# Patient Record
Sex: FEMALE | Race: WHITE | Employment: FULL TIME | URBAN - METROPOLITAN AREA
[De-identification: names, ages, dates, MRNs, and addresses within clinical notes are randomized per-mention and may not be internally consistent; named-entity substitution may affect disease eponyms.]

---

## 2017-02-06 ENCOUNTER — HOSPITAL ENCOUNTER (EMERGENCY)
Age: 27
Discharge: HOME | End: 2017-02-06
Attending: EMERGENCY MEDICINE
Payer: MEDICAID

## 2017-02-06 VITALS
RESPIRATION RATE: 14 BRPM | DIASTOLIC BLOOD PRESSURE: 75 MMHG | HEIGHT: 62 IN | TEMPERATURE: 98.2 F | OXYGEN SATURATION: 100 % | SYSTOLIC BLOOD PRESSURE: 112 MMHG | HEART RATE: 88 BPM | WEIGHT: 153 LBS | BODY MASS INDEX: 28.16 KG/M2

## 2017-02-06 DIAGNOSIS — K52.9 GASTROENTERITIS: Primary | ICD-10-CM

## 2017-02-06 LAB
APPEARANCE UR: CLEAR
BACTERIA URNS QL MICRO: ABNORMAL /HPF
BILIRUB UR QL: NEGATIVE
COLOR UR: YELLOW
EPITH CASTS URNS QL MICRO: ABNORMAL /LPF (ref 0–5)
GLUCOSE UR STRIP.AUTO-MCNC: NEGATIVE MG/DL
HCG UR QL: NEGATIVE
HGB UR QL STRIP: ABNORMAL
KETONES UR QL STRIP.AUTO: NEGATIVE MG/DL
LEUKOCYTE ESTERASE UR QL STRIP.AUTO: ABNORMAL
NITRITE UR QL STRIP.AUTO: NEGATIVE
PH UR STRIP: 7.5 [PH] (ref 5–8)
PROT UR STRIP-MCNC: NEGATIVE MG/DL
RBC #/AREA URNS HPF: ABNORMAL /HPF (ref 0–5)
SP GR UR REFRACTOMETRY: 1.01 (ref 1–1.03)
UROBILINOGEN UR QL STRIP.AUTO: 0.2 EU/DL (ref 0.2–1)
WBC URNS QL MICRO: ABNORMAL /HPF (ref 0–4)

## 2017-02-06 PROCEDURE — 99284 EMERGENCY DEPT VISIT MOD MDM: CPT

## 2017-02-06 PROCEDURE — 81025 URINE PREGNANCY TEST: CPT | Performed by: EMERGENCY MEDICINE

## 2017-02-06 PROCEDURE — 81001 URINALYSIS AUTO W/SCOPE: CPT | Performed by: EMERGENCY MEDICINE

## 2017-02-06 RX ORDER — ACETAMINOPHEN AND CODEINE PHOSPHATE 300; 30 MG/1; MG/1
1 TABLET ORAL
COMMUNITY

## 2017-02-06 RX ORDER — ONDANSETRON 8 MG/1
8 TABLET, ORALLY DISINTEGRATING ORAL
Qty: 30 TAB | Refills: 0 | Status: SHIPPED | OUTPATIENT
Start: 2017-02-06 | End: 2018-02-26

## 2017-02-06 RX ORDER — SUMATRIPTAN 25 MG/1
25 TABLET, FILM COATED ORAL
COMMUNITY

## 2017-02-06 NOTE — LETTER
NOTIFICATION RETURN TO WORK / SCHOOL 
 
2/6/2017 4:17 PM 
 
Ms. Sandy Kruse [address] [facility] [id number] To Whom It May Concern: 
 
Sandy Kruse is currently under the care of [id number] [facility] EMERGENCY DEPT. She will return to work/school on: 2/8/17 If there are questions or concerns please have the patient contact our office.  
 
 
 
Sincerely, 
 
 
Gurdeep Condon MD

## 2017-02-06 NOTE — DISCHARGE INSTRUCTIONS
Gastroenteritis: Care Instructions  Your Care Instructions  Gastroenteritis is an illness that may cause nausea, vomiting, and diarrhea. It is sometimes called \"stomach flu. \" It can be caused by bacteria or a virus. You will probably begin to feel better in 1 to 2 days. In the meantime, get plenty of rest and make sure you do not become dehydrated. Dehydration occurs when your body loses too much fluid. Follow-up care is a key part of your treatment and safety. Be sure to make and go to all appointments, and call your doctor if you are having problems. Its also a good idea to know your test results and keep a list of the medicines you take. How can you care for yourself at home? · If your doctor prescribed antibiotics, take them as directed. Do not stop taking them just because you feel better. You need to take the full course of antibiotics. · Drink plenty of fluids to prevent dehydration, enough so that your urine is light yellow or clear like water. Choose water and other caffeine-free clear liquids until you feel better. If you have kidney, heart, or liver disease and have to limit fluids, talk with your doctor before you increase your fluid intake. · Drink fluids slowly, in frequent, small amounts, because drinking too much too fast can cause vomiting. · Begin eating mild foods, such as dry toast, yogurt, applesauce, bananas, and rice. Avoid spicy, hot, or high-fat foods, and do not drink alcohol or caffeine for a day or two. Do not drink milk or eat ice cream until you are feeling better. How to prevent gastroenteritis  · Keep hot foods hot and cold foods cold. · Do not eat meats, dressings, salads, or other foods that have been kept at room temperature for more than 2 hours. · Use a thermometer to check your refrigerator. It should be between 34°F and 40°F.  · Defrost meats in the refrigerator or microwave, not on the kitchen counter. · Keep your hands and your kitchen clean.  Wash your hands, cutting boards, and countertops with hot soapy water frequently. · Cook meat until it is well done. · Do not eat raw eggs or uncooked sauces made with raw eggs. · Do not take chances. If food looks or tastes spoiled, throw it out. When should you call for help? Call 911 anytime you think you may need emergency care. For example, call if:  · You vomit blood or what looks like coffee grounds. · You passed out (lost consciousness). · You pass maroon or very bloody stools. Call your doctor now or seek immediate medical care if:  · You have severe belly pain. · You have signs of needing more fluids. You have sunken eyes, a dry mouth, and pass only a little dark urine. · You feel like you are going to faint. · You have increased belly pain that does not go away in 1 to 2 days. · You have new or increased nausea, or you are vomiting. · You have a new or higher fever. · Your stools are black and tarlike or have streaks of blood. Watch closely for changes in your health, and be sure to contact your doctor if:  · You are dizzy or lightheaded. · You urinate less than usual, or your urine is dark yellow or brown. · You do not feel better with each day that goes by. Where can you learn more? Go to http://bina-renée.info/. Enter N142 in the search box to learn more about \"Gastroenteritis: Care Instructions. \"  Current as of: May 24, 2016  Content Version: 11.1  © 9448-9845 [employer]. Care instructions adapted under license by "[employer] (which disclaims liability or warranty for this information). If you have questions about a medical condition or this instruction, always ask your healthcare professional. [address] any warranty or liability for your use of this information.

## 2017-02-06 NOTE — ED PROVIDER NOTES
HPI Comments: 4:12 PM Elena Dasilva is a 27 y.o. female who presents to the ED c/o nausea onset 5 days ago. Pt also c/o vomiting, diarrhea, decreased appetite, and generalized myalgias. Pt tried Benadryl, Pepto Bismol, and Imodium to minimal relief of sx. Pt admits to sick contact- \"son who goes to day care and caught the flu. \" Pt denies fever, dysuria, or any other sx at this time. Patient is a 27 y.o. female presenting with abdominal pain, vomiting, and diarrhea. The history is provided by the patient. No  was used. Abdominal Pain    Associated symptoms include diarrhea, nausea, vomiting and myalgias (generalized). Pertinent negatives include no fever, no dysuria, no hematuria, no headaches, no arthralgias and no chest pain. Vomiting    Associated symptoms include diarrhea and myalgias (generalized). Pertinent negatives include no chills, no fever, no headaches, no arthralgias, no cough and no headaches. Diarrhea    Associated symptoms include diarrhea, nausea, vomiting and myalgias (generalized). Pertinent negatives include no fever, no dysuria, no hematuria, no headaches, no arthralgias and no chest pain. Past Medical History:   Diagnosis Date    Chronic back pain     Neck pain     Neck sprain     Radiculopathy     Scoliosis     Strep throat     Upper back pain        Past Surgical History:   Procedure Laterality Date    Hx wisdom teeth extraction           Family History:   Problem Relation Age of Onset    No Known Problems Mother     No Known Problems Father        Social History     Social History    Marital status:      Spouse name: N/A    Number of children: N/A    Years of education: N/A     Occupational History    Not on file.      Social History Main Topics    Smoking status: Current Some Day Smoker     Packs/day: 0.50     Years: 10.00     Last attempt to quit: 2/1/2016    Smokeless tobacco: Never Used    Alcohol use No    Drug use: No    Sexual activity: Not on file     Other Topics Concern    Not on file     Social History Narrative         ALLERGIES: Vicodin [hydrocodone-acetaminophen]    Review of Systems   Constitutional: Positive for appetite change (decreased). Negative for chills, fatigue and fever. HENT: Negative for congestion, rhinorrhea and sore throat. Eyes: Negative for visual disturbance. Respiratory: Negative for cough and shortness of breath. Cardiovascular: Negative for chest pain and palpitations. Gastrointestinal: Positive for diarrhea, nausea and vomiting. Genitourinary: Negative for dysuria, hematuria and urgency. Musculoskeletal: Positive for myalgias (generalized). Negative for arthralgias. Skin: Negative for rash and wound. Neurological: Negative for dizziness and headaches. Psychiatric/Behavioral: The patient is not nervous/anxious. All other systems reviewed and are negative. Vitals:    02/06/17 1331   BP: 110/78   Pulse: (!) 106   Resp: 14   Temp: 98.2 °F (36.8 °C)   SpO2: 98%   Weight: 69.4 kg (153 lb)   Height: 5' 2\" (1.575 m)            Physical Exam   Constitutional: She is oriented to person, place, and time. She appears well-developed. HENT:   Head: Normocephalic and atraumatic. Mouth/Throat: Oropharynx is clear and moist.   Eyes: Conjunctivae and EOM are normal. Pupils are equal, round, and reactive to light. Neck: Normal range of motion. Neck supple. Cardiovascular: Normal rate and regular rhythm. Pulmonary/Chest: Effort normal and breath sounds normal.   Abdominal: Soft. Musculoskeletal: Normal range of motion. Neurological: She is alert and oriented to person, place, and time. Skin: Skin is warm and dry. Psychiatric: She has a normal mood and affect. Nursing note and vitals reviewed.        [facility]  ED Course       Procedures      Medications ordered:   Medications - No data to display      Lab findings:  Labs Reviewed   URINALYSIS W/ RFLX MICROSCOPIC - Abnormal; Notable for the following:        Result Value    Blood MODERATE (*)     Leukocyte Esterase TRACE (*)     All other components within normal limits   URINE MICROSCOPIC ONLY - Abnormal; Notable for the following:     Bacteria 1+ (*)     All other components within normal limits   HCG URINE, QL       EKG interpretation:    X-Ray, CT or other radiology findings or impressions:  No orders to display       Progress notes, Consult notes or additional Procedure notes:     Reevaluation of patient:   I have reevaluated patient. Patient is feeling better. Dispo:  Patient was discharged in stable condition. Patient is to return to emergency department with any new or worsening condition. Scribe Attestation:   Orville Logan acting as a scribe for and in the presence of Jennifer Dwyer MD February 06, 2017 at 4:12 PM     Signed by: [place], February 06, 2017, 4:12 PM    Provider Attestation:   I personally performed the services described in the documentation, reviewed the documentation, as recorded by the scribe in my presence, and it accurately and completely records my words and actions.      Reviewed and signed by:  Jennifer Dwyer MD

## 2018-02-26 ENCOUNTER — APPOINTMENT (OUTPATIENT)
Dept: GENERAL RADIOLOGY | Age: 28
End: 2018-02-26
Attending: EMERGENCY MEDICINE
Payer: SELF-PAY

## 2018-02-26 ENCOUNTER — HOSPITAL ENCOUNTER (EMERGENCY)
Age: 28
Discharge: HOME | End: 2018-02-26
Attending: EMERGENCY MEDICINE
Payer: SELF-PAY

## 2018-02-26 VITALS
RESPIRATION RATE: 20 BRPM | DIASTOLIC BLOOD PRESSURE: 79 MMHG | HEIGHT: 62 IN | SYSTOLIC BLOOD PRESSURE: 120 MMHG | BODY MASS INDEX: 27.6 KG/M2 | OXYGEN SATURATION: 100 % | TEMPERATURE: 98.7 F | WEIGHT: 150 LBS | HEART RATE: 105 BPM

## 2018-02-26 DIAGNOSIS — R05.9 COUGH: ICD-10-CM

## 2018-02-26 DIAGNOSIS — J02.9 ACUTE PHARYNGITIS, UNSPECIFIED ETIOLOGY: Primary | ICD-10-CM

## 2018-02-26 PROCEDURE — 99282 EMERGENCY DEPT VISIT SF MDM: CPT

## 2018-02-26 PROCEDURE — 71046 X-RAY EXAM CHEST 2 VIEWS: CPT

## 2018-02-26 RX ORDER — AMOXICILLIN 500 MG/1
500 TABLET, FILM COATED ORAL
Qty: 14 TAB | Refills: 0 | Status: SHIPPED | OUTPATIENT
Start: 2018-02-26 | End: 2018-03-05

## 2018-02-26 RX ORDER — IBUPROFEN 600 MG/1
600 TABLET ORAL
Qty: 10 TAB | Refills: 0 | Status: SHIPPED | OUTPATIENT
Start: 2018-02-26 | End: 2018-03-03

## 2018-02-26 RX ORDER — GUAIFENESIN, PSEUDOEPHEDRINE HYDROCHLORIDE 600; 60 MG/1; MG/1
1 TABLET, EXTENDED RELEASE ORAL
Qty: 14 TAB | Refills: 0 | Status: SHIPPED | OUTPATIENT
Start: 2018-02-26 | End: 2018-03-05

## 2018-02-26 NOTE — ED PROVIDER NOTES
EMERGENCY DEPARTMENT HISTORY AND PHYSICAL EXAM    7:54 AM      Date: 2/26/2018  Patient Name: Niurka Schulte    History of Presenting Illness     Chief Complaint   Patient presents with    Flu         History Provided By: Patient    Chief Complaint: URI  Duration:  Days  Timing:  Acute  Location: respriatory  Quality: n/a  Severity: Mild  Modifying Factors: n/a  Associated Symptoms: sore throat, fever that resolved, and dry cough. All other sx denied. No other complaints at this time. Additional History (Context): Niurka Schulte is a 32 y.o. female with a h/o Strep throat, and Smoking 0.5PPD who presents to the ED with c/o URI onset 5 days ago. Pt reported a sore throat, rhinorrhea, fever that resolved, and dry cough. Pt denied n/v/d, CP, SOB, urinary sx's, numbness, or weakness. All other sx denied. No other complaints at this time. PCP: Marie Haq MD      Past History     Past Medical History:  Past Medical History:   Diagnosis Date    Chronic back pain     Ill-defined condition     sleep apnea    Neck pain     Neck sprain     Radiculopathy     Scoliosis     Strep throat     Upper back pain        Past Surgical History:  Past Surgical History:   Procedure Laterality Date    HX WISDOM TEETH EXTRACTION         Family History:  Family History   Problem Relation Age of Onset    No Known Problems Mother     No Known Problems Father        Social History:  Social History   Substance Use Topics    Smoking status: Former Smoker     Packs/day: 0.50     Years: 10.00     Quit date: 2/1/2016    Smokeless tobacco: Never Used    Alcohol use No       Allergies: Allergies   Allergen Reactions    Vicodin [Hydrocodone-Acetaminophen] Other (comments)     HA         Review of Systems       Review of Systems   Constitutional: Positive for fever. Negative for appetite change. HENT: Positive for sore throat and trouble swallowing.  Negative for drooling, facial swelling, sinus pain and sinus pressure. Eyes: Negative for redness. Respiratory: Positive for cough. Negative for shortness of breath and wheezing. Gastrointestinal: Negative for abdominal pain, nausea and vomiting. Genitourinary: Negative for dysuria. Musculoskeletal: Negative for neck pain and neck stiffness. Skin: Negative for pallor. Neurological: Negative for headaches. Hematological: Does not bruise/bleed easily. All other systems reviewed and are negative. Physical Exam     Visit Vitals    /79 (BP 1 Location: Left arm)    Pulse (!) 105    Temp 98.7 °F (37.1 °C)    Resp 20    Ht 5' 2\" (1.575 m)    Wt 68 kg (150 lb)    SpO2 100%    BMI 27.44 kg/m2         Physical Exam   Constitutional: She is oriented to person, place, and time. She appears well-developed and well-nourished. No distress. Speaking full sentences. Pt is afebrile. HENT:   Head: Normocephalic and atraumatic. Mouth/Throat: Uvula is midline. No oropharyngeal exudate. Erythema in oropharynx   Eyes: Conjunctivae are normal. Pupils are equal, round, and reactive to light. Right eye exhibits no discharge. Left eye exhibits no discharge. No scleral icterus. Neck: Normal range of motion. Neck supple. No tracheal deviation present. No meningismus on her neck. Cardiovascular: Intact distal pulses. Tachycardia present. Capillary refill < 3 seconds. Slight tachycardia about 107. Pulmonary/Chest: Effort normal and breath sounds normal. No stridor. No respiratory distress. She has no wheezes. Active cough at bedside   Abdominal: Soft. Bowel sounds are normal. She exhibits no distension. There is no tenderness. Musculoskeletal: Normal range of motion. She exhibits no edema. Lymphadenopathy:     She has no cervical adenopathy. Neurological: She is alert and oriented to person, place, and time. No cranial nerve deficit. Skin: Skin is warm and dry. She is not diaphoretic.    Psychiatric: Her behavior is normal.   Nursing note and vitals reviewed. Diagnostic Study Results     Labs -  No results found for this or any previous visit (from the past 12 hour(s)). Radiologic Studies -   XR CHEST PA LAT    (Results Pending)   8:28 AM  Preliminary read showed no acute process per Dr. Jorge L George. Medical Decision Making   I am the first provider for this patient. I reviewed the vital signs, available nursing notes, past medical history, past surgical history, family history and social history. Vital Signs-Reviewed the patient's vital signs. Pulse Oximetry Analysis -  100 on room air (Interpretation)NL. Records Reviewed: Nursing Notes (Time of Review: 7:54 AM)    Provider Notes (Medical Decision Making):  MDM  Number of Diagnoses or Management Options  Acute pharyngitis, unspecified etiology:   Cough:   Diagnosis management comments: 8:14 AM  DDX pneumonia, URI, strep throat, flu. Will get CXR. If NL likely acute pharyngitis. Pt does states feels similar to her previous strep throat. If is flu is outside window to treat with Tamiflu will tx sx's with Tylenol nasal spray and cough medicine. Pt not at risk pt for flu complications. Amount and/or Complexity of Data Reviewed  Tests in the radiology section of CPT®: ordered and reviewed    Patient Progress  Patient progress: stable      Medications - No data to display    Procedures:     Core Measures:     Critical Care Time:     ED Course: Progress Notes, Reevaluation, and Consults:  I have reassessed the patient. I have discussed the workup, results and plan with the patient and patient is in agreement. Patient has no new complaint. Patient will be prescribed Amoxicillin, saline nasal spray, motrin, mucinex-d. Patient was discharge in stable condition. Patient was given outpatient follow up. Patient is to return to emergency department if any new or worsening condition. Diagnosis     Clinical Impression:   1. Acute pharyngitis, unspecified etiology    2. Cough        Disposition: d/c    Follow-up Information     Follow up With Details Comments [address], [place] Go in 2 days For follow up [address]  [address] [address] EMERGENCY DEPT Go to As needed, If symptoms worsen [address] [facility]  [phone]           Discharge Medication List as of 2/26/2018  8:34 AM      START taking these medications    Details   amoxicillin 500 mg tab Take 500 mg by mouth two (2) times daily as needed for up to 7 days. , Print, Disp-14 Tab, R-0      sodium chloride (SALINE NASAL) 0.65 % nasal squeeze bottle 0.1 mL by Both Nostrils route as needed for Congestion. Indications: Nasal Congestion, Print, Disp-15 mL, R-0      PSEUDOEPHEDRINE-guaiFENesin (MUCINEX D)  mg per tablet Take 1 Tab by mouth every twelve (12) hours as needed for up to 7 days. Indications: Cough, Nasal Congestion, Print, Disp-14 Tab, R-0      ibuprofen (MOTRIN) 600 mg tablet Take 1 Tab by mouth every eight (8) hours as needed for Pain for up to 5 days.  Indications: Fever, Pain, Print, Disp-10 Tab, R-0         CONTINUE these medications which have NOT CHANGED    Details   SUMAtriptan (IMITREX) 25 mg tablet Take 25 mg by mouth once as needed for Migraine., Historical Med      acetaminophen-codeine (TYLENOL-CODEINE #3) 300-30 mg per tablet Take 1 Tab by mouth every four (4) hours as needed for Pain., Historical Med      levonorgestrel (MIRENA) 20 mcg/24 hr (5 years) IUD 1 each by IntraUTERine route once., Historical Med           _______________________________    Attestations:  Scribe Attestation     Yareli Graves acting as a scribe for and in the presence of Leobardo Alexander, DO      February 26, 2018 at 8:28 AM       Provider Attestation:      I personally performed the services described in the documentation, reviewed the documentation, as recorded by the scribe in my presence, and it accurately and completely records my words and actions.  February 26, 2018 at 8:28 FARRUKH Melchor DO    _______________________________

## 2018-02-26 NOTE — DISCHARGE INSTRUCTIONS
Sore Throat: Care Instructions  Your Care Instructions    Infection by bacteria or a virus causes most sore throats. Cigarette smoke, dry air, air pollution, allergies, and yelling can also cause a sore throat. Sore throats can be painful and annoying. Fortunately, most sore throats go away on their own. If you have a bacterial infection, your doctor may prescribe antibiotics. Follow-up care is a key part of your treatment and safety. Be sure to make and go to all appointments, and call your doctor if you are having problems. It's also a good idea to know your test results and keep a list of the medicines you take. How can you care for yourself at home? · If your doctor prescribed antibiotics, take them as directed. Do not stop taking them just because you feel better. You need to take the full course of antibiotics. · Gargle with warm salt water once an hour to help reduce swelling and relieve discomfort. Use 1 teaspoon of salt mixed in 1 cup of warm water. · Take an over-the-counter pain medicine, such as acetaminophen (Tylenol), ibuprofen (Advil, Motrin), or naproxen (Aleve). Read and follow all instructions on the label. · Be careful when taking over-the-counter cold or flu medicines and Tylenol at the same time. Many of these medicines have acetaminophen, which is Tylenol. Read the labels to make sure that you are not taking more than the recommended dose. Too much acetaminophen (Tylenol) can be harmful. · Drink plenty of fluids. Fluids may help soothe an irritated throat. Hot fluids, such as tea or soup, may help decrease throat pain. · Use over-the-counter throat lozenges to soothe pain. Regular cough drops or hard candy may also help. These should not be given to young children because of the risk of choking. · Do not smoke or allow others to smoke around you. If you need help quitting, talk to your doctor about stop-smoking programs and medicines.  These can increase your chances of quitting for good. · Use a vaporizer or humidifier to add moisture to your bedroom. Follow the directions for cleaning the machine. When should you call for help? Call your doctor now or seek immediate medical care if:  ? · You have new or worse trouble swallowing. ? · Your sore throat gets much worse on one side. ? Watch closely for changes in your health, and be sure to contact your doctor if you do not get better as expected. Where can you learn more? Go to http://bina-renée.info/. Enter 062 441 80 19 in the search box to learn more about \"Sore Throat: Care Instructions. \"  Current as of: May 12, 2017  Content Version: 11.4  © 2571-7806 [employer]. Care instructions adapted under license by [employer] (which disclaims liability or warranty for this information). If you have questions about a medical condition or this instruction, always ask your healthcare professional. [place] [address] any warranty or liability for your use of this information. Cough: Care Instructions  Your Care Instructions    A cough is your body's response to something that bothers your throat or airways. Many things can cause a cough. You might cough because of a cold or the flu, bronchitis, or asthma. Smoking, postnasal drip, allergies, and stomach acid that backs up into your throat also can cause coughs. A cough is a symptom, not a disease. Most coughs stop when the cause, such as a cold, goes away. You can take a few steps at home to cough less and feel better. Follow-up care is a key part of your treatment and safety. Be sure to make and go to all appointments, and call your doctor if you are having problems. It's also a good idea to know your test results and keep a list of the medicines you take. How can you care for yourself at home? · Drink lots of water and other fluids. This helps thin the mucus and soothes a dry or sore throat.  Honey or lemon juice in hot water or tea may ease a dry cough. · Take cough medicine as directed by your doctor. · Prop up your head on pillows to help you breathe and ease a dry cough. · Try cough drops to soothe a dry or sore throat. Cough drops don't stop a cough. Medicine-flavored cough drops are no better than candy-flavored drops or hard candy. · Do not smoke. Avoid secondhand smoke. If you need help quitting, talk to your doctor about stop-smoking programs and medicines. These can increase your chances of quitting for good. When should you call for help? Call 911 anytime you think you may need emergency care. For example, call if:  ? · You have severe trouble breathing. ?Call your doctor now or seek immediate medical care if:  ? · You cough up blood. ? · You have new or worse trouble breathing. ? · You have a new or higher fever. ? · You have a new rash. ? Watch closely for changes in your health, and be sure to contact your doctor if:  ? · You cough more deeply or more often, especially if you notice more mucus or a change in the color of your mucus. ? · You have new symptoms, such as a sore throat, an earache, or sinus pain. ? · You do not get better as expected. Where can you learn more? Go to http://bina-renée.info/. Enter D279 in the search box to learn more about \"Cough: Care Instructions. \"  Current as of: May 12, 2017  Content Version: 11.4  © 9478-9756 [employer]. Care instructions adapted under license by [employer] (which disclaims liability or warranty for this information). If you have questions about a medical condition or this instruction, always ask your healthcare professional. [place] [address] any warranty or liability for your use of this information.

## 2018-05-24 ENCOUNTER — HOSPITAL ENCOUNTER (EMERGENCY)
Age: 28
Discharge: HOME | End: 2018-05-24
Attending: EMERGENCY MEDICINE
Payer: MEDICAID

## 2018-05-24 VITALS
TEMPERATURE: 98.3 F | RESPIRATION RATE: 18 BRPM | BODY MASS INDEX: 28.89 KG/M2 | HEIGHT: 62 IN | DIASTOLIC BLOOD PRESSURE: 60 MMHG | OXYGEN SATURATION: 96 % | WEIGHT: 157 LBS | SYSTOLIC BLOOD PRESSURE: 110 MMHG | HEART RATE: 88 BPM

## 2018-05-24 DIAGNOSIS — L74.0 HEAT RASH: Primary | ICD-10-CM

## 2018-05-24 DIAGNOSIS — R21 RASH AND NONSPECIFIC SKIN ERUPTION: ICD-10-CM

## 2018-05-24 PROCEDURE — 99283 EMERGENCY DEPT VISIT LOW MDM: CPT

## 2018-05-24 RX ORDER — PREDNISONE 20 MG/1
20 TABLET ORAL DAILY
Qty: 10 TAB | Refills: 0 | Status: SHIPPED | OUTPATIENT
Start: 2018-05-24 | End: 2018-06-03

## 2018-05-24 RX ORDER — LEVOTHYROXINE AND LIOTHYRONINE 38; 9 UG/1; UG/1
60 TABLET ORAL DAILY
COMMUNITY

## 2018-05-24 NOTE — ED PROVIDER NOTES
HPI Comments: Leopoldo Mazzoni is a 25 y.o. female with hx of hypothyroid and migraine that presets to the ED with a complaint of rash and peeling skin x1 week. PT state she was recently diagnosed with hypothyroid and prescribed NP Thyroid, they have been changing the dosage with last dosage change last week. Since that time pt states her fingers began peeling and she developed a rash below breasts and on inner thighs. She called PCP who discontinued her medications and they told her to come to ED for evaluation. She denies pain unless skin catches on something. NO other complaints at this time. SHe denies new lotions, soaps, make ups, cleaning detergents. Patient is a 25 y.o. female presenting with medication reaction. The history is provided by the patient. Medication Reaction    This is a new problem. The current episode started more than 2 days ago. The problem has not changed since onset. She has not vomited. There were no pill fragments found in her mouth. Pertinent negatives include no unusual behavior, no slurred speech, no nausea, no vomiting, no depression and no shortness of breath. Past Medical History:   Diagnosis Date    Chronic back pain     Ill-defined condition     sleep apnea    Neck pain     Neck sprain     Radiculopathy     Scoliosis     Strep throat     Upper back pain        Past Surgical History:   Procedure Laterality Date    HX WISDOM TEETH EXTRACTION           Family History:   Problem Relation Age of Onset    No Known Problems Mother     No Known Problems Father        Social History     Social History    Marital status:      Spouse name: N/A    Number of children: N/A    Years of education: N/A     Occupational History    Not on file.      Social History Main Topics    Smoking status: Former Smoker     Packs/day: 0.50     Years: 10.00     Quit date: 2/1/2016    Smokeless tobacco: Never Used    Alcohol use No    Drug use: No    Sexual activity: Not on file     Other Topics Concern    Not on file     Social History Narrative         ALLERGIES: Vicodin [hydrocodone-acetaminophen]    Review of Systems   Constitutional: Negative for fatigue. HENT: Negative for congestion. Eyes: Negative for redness. Respiratory: Negative for cough and shortness of breath. Cardiovascular: Negative for chest pain. Gastrointestinal: Negative for abdominal pain, diarrhea, nausea and vomiting. Genitourinary: Negative for difficulty urinating and dysuria. Musculoskeletal: Negative for back pain and myalgias. Skin: Positive for color change and rash. Neurological: Negative for dizziness and headaches. Psychiatric/Behavioral: Negative for depression. All other systems reviewed and are negative. Vitals:    05/24/18 1611   BP: 110/60   Pulse: 88   Resp: 18   Temp: 98.3 °F (36.8 °C)   SpO2: 96%   Weight: 71.2 kg (157 lb)   Height: 5' 2\" (1.575 m)            Physical Exam   Constitutional: She is oriented to person, place, and time. She appears well-developed and well-nourished. No distress. HENT:   Head: Normocephalic and atraumatic. Nose: Nose normal.   Eyes: Conjunctivae are normal. Pupils are equal, round, and reactive to light. Neck: Normal range of motion. Cardiovascular: Normal rate, regular rhythm and normal heart sounds. Pulmonary/Chest: Effort normal and breath sounds normal. No respiratory distress. She has no wheezes. Musculoskeletal: Normal range of motion. Neurological: She is alert and oriented to person, place, and time. Skin: Skin is warm. Rash noted. No ecchymosis noted. Rash is maculopapular. No erythema. Skin peeling from finger tips of both hands and along webbing of fingers. NO erythema noted    Erythema noted below breasts and inner thighs bilaterally    Psychiatric: She has a normal mood and affect. Her behavior is normal.   Vitals reviewed.        MDM  Number of Diagnoses or Management Options  Heat rash:   Rash and nonspecific skin eruption:   Diagnosis management comments: Pt has 2 different rashes, heat rash to inner thighs and below breasts and second rash to fingers and wrists bilaterally. Pt is scheduled to follow up with PCP Tuesday next week    Impression: head rash, rash and nonspecific skin eruption. Risk of Complications, Morbidity, and/or Mortality  Presenting problems: low  Diagnostic procedures: low  Management options: low    Patient Progress  Patient progress: stable        ED Course       Procedures             Vitals:  Patient Vitals for the past 12 hrs:   Temp Pulse Resp BP SpO2   05/24/18 1611 98.3 °F (36.8 °C) 88 18 110/60 96 %         Medications ordered:   Medications - No data to display      Lab findings:  No results found for this or any previous visit (from the past 12 hour(s)). X-Ray, CT or other radiology findings or impressions:  No orders to display       Progress notes, Consult notes or additional Procedure notes:       Disposition:  Diagnosis:   1. Heat rash    2. Rash and nonspecific skin eruption        Disposition: dsicharge    Follow-up Information     Follow up With Details Comments [address], MD Call As needed, follow up [address] [place] [address] [address] EMERGENCY DEPT  If symptoms worsen [address] [facility] [id number] [phone]           Patient's Medications   Start Taking    PREDNISONE (DELTASONE) 20 MG TABLET    Take 1 Tab by mouth daily for 10 days. With Breakfast   Continue Taking    ACETAMINOPHEN-CODEINE (TYLENOL-CODEINE #3) 300-30 MG PER TABLET    Take 1 Tab by mouth every four (4) hours as needed for Pain. LEVONORGESTREL (MIRENA) 20 MCG/24 HR (5 YEARS) IUD    1 each by IntraUTERine route once. SODIUM CHLORIDE (SALINE NASAL) 0.65 % NASAL SQUEEZE BOTTLE    0.1 mL by Both Nostrils route as needed for Congestion.  Indications: Nasal Congestion    SUMATRIPTAN (IMITREX) 25 MG TABLET Take 25 mg by mouth once as needed for Migraine. THYROID, PORK, (NP THYROID) 60 MG TABLET    Take 60 mg by mouth daily.    These Medications have changed    No medications on file   Stop Taking    No medications on file

## 2018-05-24 NOTE — LETTER
NOTIFICATION RETURN TO WORK / SCHOOL 
 
5/24/2018 5:15 PM 
 
Ms. Poncho Maria [address] [facility] [address] To Whom It May Concern: 
 
Poncho Maria is currently under the care of [address] [facility] EMERGENCY DEPT. She will return to work/school on: 5/25/18 If there are questions or concerns please have the patient contact our office.  
 
 
 
Sincerely, 
 
 
JOE Richardson

## 2018-05-24 NOTE — DISCHARGE INSTRUCTIONS
Rash: Care Instructions  Your Care Instructions  A rash is any irritation or inflammation of the skin. Rashes have many possible causes, including allergy, infection, illness, heat, and emotional stress. Follow-up care is a key part of your treatment and safety. Be sure to make and go to all appointments, and call your doctor if you are having problems. It's also a good idea to know your test results and keep a list of the medicines you take. How can you care for yourself at home? · Wash the area with water only. Soap can make dryness and itching worse. Pat dry. · Put cold, wet cloths on the rash to reduce itching. · Keep cool, and stay out of the sun. · Leave the rash open to the air as much of the time as possible. · Sometimes petroleum jelly (Vaseline) can help relieve the discomfort caused by a rash. A moisturizing lotion, such as Cetaphil, also may help. Calamine lotion may help for rashes caused by contact with something (such as a plant or soap) that irritated the skin. Use it 3 or 4 times a day. · If your doctor prescribed a cream, use it as directed. If your doctor prescribed medicine, take it exactly as directed. · If your rash itches so badly that it interferes with your normal activities, take an over-the-counter antihistamine, such as diphenhydramine (Benadryl) or loratadine (Claritin). Read and follow all instructions on the label. When should you call for help? Call your doctor now or seek immediate medical care if:  ? · You have signs of infection, such as:  ¨ Increased pain, swelling, warmth, or redness. ¨ Red streaks leading from the area. ¨ Pus draining from the area. ¨ A fever. ? · You have joint pain along with the rash. ? Watch closely for changes in your health, and be sure to contact your doctor if:  ? · Your rash is changing or getting worse. For example, call if you have pain along with the rash, the rash is spreading, or you have new blisters.    ? · You do not get better after 1 week. Where can you learn more? Go to http://bina-renée.info/. Enter R687 in the search box to learn more about \"Rash: Care Instructions. \"  Current as of: October 13, 2016  Content Version: 11.4  © 1151-5162 [employer]wise, [employer]. Care instructions adapted under license by [employer] (which disclaims liability or warranty for this information). If you have questions about a medical condition or this instruction, always ask your healthcare professional. [address] any warranty or liability for your use of this information.

## 2020-02-27 ENCOUNTER — HOSPITAL ENCOUNTER (OUTPATIENT)
Dept: PHYSICAL THERAPY | Age: 30
Discharge: HOME | End: 2020-02-27
Payer: COMMERCIAL

## 2020-02-27 PROCEDURE — 97161 PT EVAL LOW COMPLEX 20 MIN: CPT

## 2020-02-27 PROCEDURE — 97110 THERAPEUTIC EXERCISES: CPT

## 2020-02-27 NOTE — PROGRESS NOTES
[facility] [address]  [address] [facility] [address], [address] [phone] fax    Plan of Care/ Statement of Necessity for Physical Therapy Services           Patient name: Jose Roberto Baum Start of Care: 2020   Referral source: Jovi Guevara MD : 1990    Medical Diagnosis: Left knee pain [M25.562]  Payor: Perficient / Plan: [facility] PPO / Product Type: PPO /  Onset Date:2019    Treatment Diagnosis: left knee pain   Prior Hospitalization: see medical history Provider#: [id number]   Medications: Verified on Patient summary List    Comorbidities: thyroid issues   Prior Level of Function: ,  functionally independent    The Plan of Care and following information is based on the information from the initial evaluation. Assessment/ key information: Patient is a 30 y. o.female presenting with Left knee pain [M25.562]. Mr. Michael Louis presents to initial PT evaluation with c/o left knee pain s/p traumatic patellar dislocation sustained while dancing in Dec 2019. She displays limited quad activation, limited knee ROM, and altered gait. Emphasis of care will be on pain and edema control, with progression of LE stability and ROM activities as able. Patient will benefit from skilled PT services to address deficits and facilitate return to premorbid activity level and promote improved quality of life.        Evaluation Complexity History LOW Complexity : Zero comorbidities / personal factors that will impact the outcome / POC; Examination LOW Complexity : 1-2 Standardized tests and measures addressing body structure, function, activity limitation and / or participation in recreation  ;Presentation LOW Complexity : Stable, uncomplicated  ;Clinical Decision Making MEDIUM Complexity : FOTO score of 26-74  Overall Complexity Rating: LOW   Problem List: pain affecting function, decrease ROM, decrease strength, edema affecting function, impaired gait/ balance, decrease ADL/ functional abilitiies, decrease activity tolerance, decrease flexibility/ joint mobility and decrease transfer abilities   Treatment Plan may include any combination of the following: Therapeutic exercise, Therapeutic activities, Neuromuscular re-education, Physical agent/modality, Gait/balance training, Manual therapy, Aquatic therapy, Patient education, Self Care training, Functional mobility training, Home safety training and Stair training  Patient / Family readiness to learn indicated by: asking questions, trying to perform skills and interest  Persons(s) to be included in education: patient (P)  Barriers to Learning/Limitations: None  Patient Goal (s): more motion, off brace  Patient Self Reported Health Status: good  Rehabilitation Potential: good  Short Term Goals: To be accomplished in 1 weeks:  1. Therapist to establish HEP for strength and ROM to improve ease with ADLs/gait. Long Term Goals: To be accomplished in 4 weeks:  1. Patient will be independent with HEP to improve carryover of functional gains with ADLs between visits. Eval Status:n/a  2. Pt will increase left knee AROM to 0-130 deg to normalize gait pattern. Eval Status:0-84  3. Pt will increase FOTO score to 63 points to demonstrate improved functional mobility. Eval Status: FOTO: 46  4. Pt will increase right knee extension/ knee flexion/ hip flexion/hip abduction strength to grossly 4+/5 to improve ease with gait. Eval Status:    Knee extension: 2/5    Knee flexion:2/5   Hip flexion:3/5    Hip abduction: 4/5  5. Pt will perform 12 steps using single handrail using reciprocal pattern to allow pt to ascend second story of his/her home. Eval Status:single step pattern. Frequency / Duration: Patient to be seen 2 times per week for 4 weeks.     Patient/ Caregiver education and instruction: Diagnosis, prognosis, self care, activity modification and exercises   [x]  Plan of care has been reviewed with KIRA Schwarz, PT 2/27/2020 10:01 AM    ________________________________________________________________________    I certify that the above Therapy Services are being furnished while the patient is under my care. I agree with the treatment plan and certify that this therapy is necessary.     Physician's Signature:____________Date:_________TIME:________    ** Signature, Date and Time must be completed for valid certification **    Please sign and return to [facility] [address]  [address] [facility] [address], [address] [phone] fax

## 2020-02-27 NOTE — PROGRESS NOTES
PT DAILY TREATMENT NOTE - [facility]     Patient Name: Ana Lilia Benoit  Date:2020  : 1990  [x]  Patient  Verified  Payor: BLUE HALEY / Plan: Linda Barrios [id number] PPO / Product Type: PPO /    In time:905  Out time:940  Total Treatment Time (min): 35  Total Timed Codes (min): 10  1:1 Treatment Time ( only): 35   Visit #: 1 of 8    Treatment Area: Left knee pain [M25.562]    SUBJECTIVE  Pain Level (0-10 scale): 4  Any medication changes, allergies to medications, adverse drug reactions, diagnosis change, or new procedure performed?: [x] No    [] Yes (see summary sheet for update)  Subjective functional status:   [x] See Eval form in paper chart      OBJECTIVE    25 min [x]Eval                  []Re-Eval       10 min Therapeutic Exercise:  [x] See flow sheet :   Rationale: increase ROM, increase strength and improve coordination to improve the patients ability to normalize gait & balance. With   [] TE   [] TA   [] neuro   [] other: Patient Education: [x] Review HEP    [] Progressed/Changed HEP based on:   [] positioning   [] body mechanics   [] transfers   [] heat/ice application    [] other:                  Pain Level (0-10 scale) post treatment: 4    ASSESSMENT:   [x]  See Evaluation         Goals:  Short Term Goals: To be accomplished in 1 weeks:  1. Therapist to establish HEP for strength and ROM to improve ease with ADLs/gait. Long Term Goals: To be accomplished in 4 weeks:  1. Patient will be independent with HEP to improve carryover of functional gains with ADLs between visits. Eval Status:n/a  2. Pt will increase left knee AROM to 0-130 deg to normalize gait pattern. Eval Status:0-84  3. Pt will increase FOTO score to 63 points to demonstrate improved functional mobility. Eval Status: FOTO: 46  4. Pt will increase right knee extension/ knee flexion/ hip flexion/hip abduction strength to grossly 4+/5 to improve ease with gait.     Eval Status:    Knee extension: 2/5    Knee flexion:2/5   Hip flexion:3/5    Hip abduction: 4/5  5. Pt will perform 12 steps using single handrail using reciprocal pattern to allow pt to ascend second story of his/her home. Eval Status:single step pattern.       PLAN      [x]  Continue plan of care    []  Other:_      Shayla Webber, PT 2/27/2020  10:08 AM

## 2020-03-06 ENCOUNTER — HOSPITAL ENCOUNTER (OUTPATIENT)
Dept: PHYSICAL THERAPY | Age: 30
Discharge: HOME | End: 2020-03-06
Payer: COMMERCIAL

## 2020-03-06 PROCEDURE — 97112 NEUROMUSCULAR REEDUCATION: CPT

## 2020-03-06 PROCEDURE — 97110 THERAPEUTIC EXERCISES: CPT

## 2020-03-06 PROCEDURE — 97014 ELECTRIC STIMULATION THERAPY: CPT

## 2020-03-06 NOTE — PROGRESS NOTES
PT DAILY TREATMENT NOTE 10-18    Patient Name: Dotty Jaffe  Date:3/6/2020  : 1990  [x]  Patient  Verified  Payor: BLUE CROSS / Plan: [facility] PPO / Product Type: PPO /    In time:730  Out time:832  Total Treatment Time (min): 58  Visit #: 2 of 8    Medicare/BCBS Only   Total Timed Codes (min):  52 1:1 Treatment Time:  40       Treatment Area: Left knee pain [M25.562]    SUBJECTIVE  Pain Level (0-10 scale): 7  Any medication changes, allergies to medications, adverse drug reactions, diagnosis change, or new procedure performed?: [x] No    [] Yes (see summary sheet for update)  Subjective functional status/changes:   [] No changes reported  \"I think it's hurting more because of the rain. \"    OBJECTIVE    Modality rationale: decrease edema, decrease inflammation and decrease pain to improve the patients ability to improve ease with gait.     Min Type Additional Details   10 [x] Estim:  [x]Unatt       [x]IFC  []Premod                        []Other:  [x]w/ice   []w/heat  Position:supine with leg on wedge  Location:left knee    [] Estim: []Att    []TENS instruct  []NMES                    []Other:  []w/US   []w/ice   []w/heat  Position:  Location:    []  Traction: [] Cervical       []Lumbar                       [] Prone          []Supine                       []Intermittent   []Continuous Lbs:  [] before manual  [] after manual    []  Ultrasound: []Continuous   [] Pulsed                           []1MHz   []3MHz W/cm2:  Location:    []  Iontophoresis with dexamethasone         Location: [] Take home patch   [] In clinic    []  Ice     []  heat  []  Ice massage  []  Laser   []  Anodyne Position:  Location:    []  Laser with stim  []  Other:  Position:  Location:    []  Vasopneumatic Device Pressure:       [] lo [] med [] hi   Temperature: [] lo [] med [] hi   [x] Skin assessment post-treatment:  [x]intact []redness- no adverse reaction    []redness  adverse reaction:       25 min Therapeutic Exercise:  [x] See flow sheet :   Rationale: increase ROM, increase strength, improve coordination, improve balance and increase proprioception to improve the patients ability to perform ADLs. 27 min Neuromuscular Re-education:  [x]  See flow sheet :quad re-ed   Rationale: increase ROM, increase strength, improve coordination, improve balance and increase proprioception  to improve the patients ability to normalize gait & balance. With   [] TE   [] TA   [] neuro   [] other: Patient Education: [x] Review HEP    [] Progressed/Changed HEP based on:   [] positioning   [] body mechanics   [] transfers   [] heat/ice application    [] other:      Other Objective/Functional Measures:      Pain Level (0-10 scale) post treatment: 5    ASSESSMENT/Changes in Function: Ms. Gallo Leonard was painful with knee flexion activities today but motivated to participate. Saw improved pain with modalities. Patient will continue to benefit from skilled PT services to modify and progress therapeutic interventions, address functional mobility deficits, address ROM deficits, address strength deficits, analyze and address soft tissue restrictions, analyze and cue movement patterns, analyze and modify body mechanics/ergonomics, assess and modify postural abnormalities, address imbalance/dizziness and instruct in home and community integration to attain remaining goals. []  See Plan of Care  []  See progress note/recertification  []  See Discharge Summary         Progress towards goals / Updated goals:  Short Term Goals: To be accomplished in 1 weeks:  1. Therapist to establish HEP for strength and ROM to improve ease with ADLs/gait.       Long Term Goals: To be accomplished in 4 weeks:  1. Patient will be independent with HEP to improve carryover of functional gains with ADLs between visits. Eval Status:n/a  2. Pt will increase left knee AROM to 0-130 deg to normalize gait pattern.                Eval Status:0-84  3. Pt will increase FOTO score to 63 points to demonstrate improved functional mobility. Eval Status: FOTO: 46  4. Pt will increase right knee extension/ knee flexion/ hip flexion/hip abduction strength to grossly 4+/5 to improve ease with gait. Eval Status:              Knee extension: 2/5              Knee flexion:2/5              Hip flexion:3/5              Hip abduction: 4/5  5. Pt will perform 12 steps using single handrail using reciprocal pattern to allow pt to ascend second story of his/her home.                Eval Status:single step pattern.       PLAN  []  Upgrade activities as tolerated     [x]  Continue plan of care  []  Update interventions per flow sheet       []  Discharge due to:_  []  Other:_      Izabel Jean Baptiste, PT 3/6/2020  7:53 AM    Future Appointments   Date Time Provider Daljit Alford   3/11/2020  7:30 AM Tristan Garcia PTA [facility]   3/13/2020  7:30 AM eBn Dugan, PT [facility]   3/16/2020  7:30 AM Tristan Garcia PTA [facility]   3/18/2020  7:30 AM Tristan Garcia PTA [facility]   3/25/2020  7:30 AM Cara Eddy, PT [facility]   3/27/2020  7:30 AM Anthony Eddy, PT [facility]

## 2020-03-11 ENCOUNTER — HOSPITAL ENCOUNTER (OUTPATIENT)
Dept: PHYSICAL THERAPY | Age: 30
Discharge: HOME | End: 2020-03-11
Payer: COMMERCIAL

## 2020-03-11 PROCEDURE — 97112 NEUROMUSCULAR REEDUCATION: CPT

## 2020-03-11 PROCEDURE — 97110 THERAPEUTIC EXERCISES: CPT

## 2020-03-11 NOTE — PROGRESS NOTES
PT DAILY TREATMENT NOTE 10-18    Patient Name: Moraima He  Date:3/11/2020  : 1990  [x]  Patient  Verified  Payor: BLUE CROSS / Plan: [facility] PPO / Product Type: PPO /    In time:732  Out time:812  Total Treatment Time (min): 40  Visit #: 3 of 8    Medicare/BCBS Only   Total Timed Codes (min):  40 1:1 Treatment Time:  40       Treatment Area: Left knee pain [M25.562]    SUBJECTIVE  Pain Level (0-10 scale): 4-5  Any medication changes, allergies to medications, adverse drug reactions, diagnosis change, or new procedure performed?: [x] No    [] Yes (see summary sheet for update)  Subjective functional status/changes:   [] No changes reported  \"I was sore after last time, but not hurting as much as I thought I would. \"    OBJECTIVE    Modality rationale: patient declined   Min Type Additional Details    [] Estim:  []Unatt       []IFC  []Premod                        []Other:  []w/ice   []w/heat  Position:  Location:    [] Estim: []Att    []TENS instruct  []NMES                    []Other:  []w/US   []w/ice   []w/heat  Position:  Location:    []  Traction: [] Cervical       []Lumbar                       [] Prone          []Supine                       []Intermittent   []Continuous Lbs:  [] before manual  [] after manual    []  Ultrasound: []Continuous   [] Pulsed                           []1MHz   []3MHz W/cm2:  Location:    []  Iontophoresis with dexamethasone         Location: [] Take home patch   [] In clinic    []  Ice     []  heat  []  Ice massage  []  Laser   []  Anodyne Position:  Location:    []  Laser with stim  []  Other:  Position:  Location:    []  Vasopneumatic Device Pressure:       [] lo [] med [] hi   Temperature: [] lo [] med [] hi   [] Skin assessment post-treatment:  []intact []redness- no adverse reaction    []redness  adverse reaction:     15 min Therapeutic Exercise:  [x] See flow sheet :   Rationale: increase ROM and increase strength to improve the patients ability to perform ADLs    25 min Neuromuscular Re-education:  [x]  See flow sheet : quad re-ed activities   Rationale: increase ROM, increase strength, improve coordination, improve balance and increase proprioception  to improve the patients ability to improve mobility, stance stability, and gait        With   [x] TE   [] TA   [x] neuro   [] other: Patient Education: [x] Review HEP    [] Progressed/Changed HEP based on:   [x] positioning   [x] body mechanics   [] transfers   [] heat/ice application    [] other:      Other Objective/Functional Measures:   AROM left knee 0-76 deg     Pain Level (0-10 scale) post treatment: 2    ASSESSMENT/Changes in Function: Pt is still guarded and apprehensive with weight bearing and knee flexion. She was more efficient with her exercises today, but had a decrease in active knee flexion. Patient will continue to benefit from skilled PT services to modify and progress therapeutic interventions, address functional mobility deficits, address ROM deficits, address strength deficits, analyze and address soft tissue restrictions, analyze and cue movement patterns, analyze and modify body mechanics/ergonomics, assess and modify postural abnormalities, address imbalance/dizziness and instruct in home and community integration to attain remaining goals. [x]  See Plan of Care  []  See progress note/recertification  []  See Discharge Summary         Progress towards goals / Updated goals:  Short Term Goals: To be accomplished in 1 weeks:  1. Therapist to establish HEP for strength and ROM to improve ease with ADLs/gait.      MET  Long Term Goals: To be accomplished in 4 weeks:  1. Patient will be independent with HEP to improve carryover of functional gains with ADLs between visits.             Eval Status:n/a   Reports compliance  2. Pt will increase left knee AROM to 0-130 deg to normalize gait pattern.               Eval Status:0-84   NOT MET; 0-76 deg  3.  Pt will increase FOTO score to 63 points to demonstrate improved functional mobility.             Eval Status: FOTO: 46   Assess at 30 day jada  4. Pt will increase right knee extension/ knee flexion/ hip flexion/hip abduction strength to grossly 4+/5 to improve ease with gait.             Eval Status:              Knee extension: 2/5              Knee flexion:2/5              [place] flexion:3/5              Hip abduction: 4/5   Making progress  5. Pt will perform 12 steps using single handrail using reciprocal pattern to allow pt to ascend second story of his/her home.               Eval Status:single step pattern.    Reports improvement with ascending, but still challenged with descending    PLAN  []  Upgrade activities as tolerated     [x]  Continue plan of care  []  Update interventions per flow sheet       []  Discharge due to:_  []  Other:_      Vaishnavi Young PTA, CSCS 3/11/2020  8:17 AM    Future Appointments   Date Time Provider Daljit Fernandesi   3/13/2020  7:30 AM Ras Light, PT [facility]   3/16/2020  7:30 AM Melissa Cargo, PTA [facility]   3/18/2020  7:30 AM Melissa Cargo, PTA [facility]   3/25/2020  7:30 AM Brooklynn Bay, PT [facility]   3/27/2020  7:30 AM Ras Light, PT [facility]

## 2020-03-13 ENCOUNTER — HOSPITAL ENCOUNTER (OUTPATIENT)
Dept: PHYSICAL THERAPY | Age: 30
Discharge: HOME | End: 2020-03-13
Payer: COMMERCIAL

## 2020-03-13 PROCEDURE — 97110 THERAPEUTIC EXERCISES: CPT

## 2020-03-13 PROCEDURE — 97112 NEUROMUSCULAR REEDUCATION: CPT

## 2020-03-13 PROCEDURE — 97014 ELECTRIC STIMULATION THERAPY: CPT

## 2020-03-13 NOTE — PROGRESS NOTES
PT DAILY TREATMENT NOTE 10-18    Patient Name: Jane Pole  Date:3/13/2020  : 1990  [x]  Patient  Verified  Payor: BLUE CROSS / Plan: [facility] PPO / Product Type: PPO /    In time:732  Out time:832  Total Treatment Time (min): 60  Visit #: 4 of 8    Medicare/BCBS Only   Total Timed Codes (min):  50 1:1 Treatment Time:  40       Treatment Area: Left knee pain [M25.562]    SUBJECTIVE  Pain Level (0-10 scale): 3  Any medication changes, allergies to medications, adverse drug reactions, diagnosis change, or new procedure performed?: [x] No    [] Yes (see summary sheet for update)  Subjective functional status/changes:   [] No changes reported  \"I'm doing ok to day. A little better. \"    OBJECTIVE  Modality rationale: decrease edema, decrease inflammation and decrease pain to improve the patients ability to improve ease with gait. Min Type Additional Details    10 [x]? Estim:  [x]? Unatt       [x]? IFC  []? Premod                        []?Other:  [x]?w/ice   []?w/heat  Position:supine with leg on wedge  Location:left knee      []? Estim: []? Att    []? TENS instruct  []? NMES                    []?Other:  []?w/US   []?w/ice   []?w/heat  Position:  Location:      []? Traction: []? Cervical       []? Lumbar                       []? Prone          []? Supine                       []?Intermittent   []? Continuous Lbs:  []? before manual  []? after manual      []? Ultrasound: []? Continuous   []? Pulsed                           []? 1MHz   []? 3MHz W/cm2:  Location:      []? Iontophoresis with dexamethasone         Location: []? Take home patch   []? In clinic      []? Ice     []?  heat  []? Ice massage  []? Laser   []? Anodyne Position:  Location:      []? Laser with stim  []? Other:  Position:  Location:      []? Vasopneumatic Device Pressure:       []? lo []? med []? hi   Temperature: []? lo []? med []? hi    [x]? Skin assessment post-treatment:  [x]? intact []? redness- no adverse reaction    []? redness  adverse reaction:        25 min Therapeutic Exercise:  [x]? See flow sheet :   Rationale: increase ROM and increase strength to improve the patients ability to perform ADLs     25 min Neuromuscular Re-education:  [x]? See flow sheet : quad re-ed activities   Rationale: increase ROM, increase strength, improve coordination, improve balance and increase proprioception  to improve the patients ability to improve mobility, stance stability, and gait. With   [] TE   [] TA   [] neuro   [] other: Patient Education: [x] Review HEP    [] Progressed/Changed HEP based on:   [] positioning   [] body mechanics   [] transfers   [] heat/ice application    [] other:      Other Objective/Functional Measures:      Pain Level (0-10 scale) post treatment: 2    ASSESSMENT/Changes in Function: Ms. Mervat Bocanegra is slowly improving ease with knee flexion but still very limited into end range . Has initiated some exercise without brace without pain. Patient will continue to benefit from skilled PT services to modify and progress therapeutic interventions, address functional mobility deficits, address ROM deficits, address strength deficits, analyze and address soft tissue restrictions, analyze and cue movement patterns, analyze and modify body mechanics/ergonomics, assess and modify postural abnormalities, address imbalance/dizziness and instruct in home and community integration to attain remaining goals. []  See Plan of Care  []  See progress note/recertification  []  See Discharge Summary         Progress towards goals / Updated goals:  Short Term Goals: To be accomplished in 1 weeks:  1. Therapist to establish HEP for strength and ROM to improve ease with ADLs/gait.                MET  Long Term Goals: To be accomplished in 4 weeks:  1. Patient will be independent with HEP to improve carryover of functional gains with ADLs between visits.                Eval Status:n/a              Reports compliance  2. Pt will increase left knee AROM to 0-130 deg to normalize gait pattern.               Eval Status:0-84              NOT MET; 0-76 deg  3. Pt will increase FOTO score to 63 points to demonstrate improved functional mobility.             Eval Status: FOTO: 46              Assess at 30 day jada  4. Pt will increase right knee extension/ knee flexion/ hip flexion/hip abduction strength to grossly 4+/5 to improve ease with gait.             Eval Status:              Knee extension: 2/5              Knee flexion:2/5              LUCIO flexion:3/5              Hip abduction: 4/5              Making progress  5. Pt will perform 12 steps using single handrail using reciprocal pattern to allow pt to ascend second story of his/her home.               Eval Status:single step pattern.               Reports improvement with ascending, but still challenged with descending  PLAN  []  Upgrade activities as tolerated     [x]  Continue plan of care  []  Update interventions per flow sheet       []  Discharge due to:_  []  Other:_      Leann Koroma PT 3/13/2020  8:10 AM    Future Appointments   Date Time Provider Daljit Alford   3/16/2020  7:30 AM Antwon Ferrari, PTA [facility]   3/18/2020  7:30 AM Antwon Ferrari PTA [facility]   3/25/2020  7:30 AM Junior Agudelo, PT [facility]   3/27/2020  7:30 AM Jaqui Gunderson, PT [facility]

## 2020-03-16 ENCOUNTER — HOSPITAL ENCOUNTER (OUTPATIENT)
Dept: PHYSICAL THERAPY | Age: 30
Discharge: HOME | End: 2020-03-16
Payer: COMMERCIAL

## 2020-03-16 PROCEDURE — 97110 THERAPEUTIC EXERCISES: CPT

## 2020-03-16 PROCEDURE — 97530 THERAPEUTIC ACTIVITIES: CPT

## 2020-03-16 NOTE — PROGRESS NOTES
PT DAILY TREATMENT NOTE 10-18    Patient Name: Francisco Lawrence  Date:3/16/2020  : 1990  [x]  Patient  Verified  Payor: BLUE CROSS / Plan: [facility] PPO / Product Type: PPO /    In time:730  Out time:817  Total Treatment Time (min): 52  Visit #: 5 of 8    Medicare/BCBS Only   Total Timed Codes (min):  47 1:1 Treatment Time:  52       Treatment Area: Left knee pain [M25.562]    SUBJECTIVE  Pain Level (0-10 scale): 4  Any medication changes, allergies to medications, adverse drug reactions, diagnosis change, or new procedure performed?: [x] No    [] Yes (see summary sheet for update)  Subjective functional status/changes:   [] No changes reported  \"The cold is making the pain a little worse. \"    OBJECTIVE    35 min Therapeutic Exercise:  [x] See flow sheet :   Rationale: increase ROM and increase strength to improve the patients ability to perform ADLs    12 min Therapeutic Activity:  []  See flow sheet :   Rationale: increase strength, improve coordination and improve balance  to improve the patients ability to perform work duties           With   [] TE   [] TA   [] neuro   [] other: Patient Education: [x] Review HEP    [] Progressed/Changed HEP based on:   [] positioning   [] body mechanics   [] transfers   [] heat/ice application    [] other:      Other Objective/Functional Measures: progressed exercises per flow sheet      Pain Level (0-10 scale) post treatment: 4    ASSESSMENT/Changes in Function: Pt tolerated progression of exercises with out increase in symptoms.  Pt progressing well over; remains challenged with SLR     Patient will continue to benefit from skilled PT services to modify and progress therapeutic interventions, address functional mobility deficits, address ROM deficits, address strength deficits, analyze and address soft tissue restrictions, analyze and cue movement patterns, analyze and modify body mechanics/ergonomics, assess and modify postural abnormalities, address imbalance/dizziness and instruct in home and community integration to attain remaining goals. []  See Plan of Care  []  See progress note/recertification  []  See Discharge Summary         Progress towards goals / Updated goals:  Short Term Goals: To be accomplished in 1 weeks:  1. Therapist to establish HEP for strength and ROM to improve ease with ADLs/gait.                MET  Long Term Goals: To be accomplished in 4 weeks:  1. Patient will be independent with HEP to improve carryover of functional gains with ADLs between visits.             Eval Status:n/a              Reports compliance  2. Pt will increase left knee AROM to 0-130 deg to normalize gait pattern.               Eval Status:0-84              NOT MET; 0-76 deg  3. Pt will increase FOTO score to 63 points to demonstrate improved functional mobility.             Eval Status: FOTO: 46              Assess at 30 day jada  4. Pt will increase right knee extension/ knee flexion/ hip flexion/hip abduction strength to grossly 4+/5 to improve ease with gait.             Eval Status:              Knee extension: 2/5              Knee flexion:2/5              NGE flexion:3/5              Hip abduction: 4/5              Tolerating progression of exercises  5.  Pt will perform 12 steps using single handrail using reciprocal pattern to allow pt to ascend second story of his/her home.               Eval Status:single step pattern.              Reports improvement with ascending, but still challenged with descending    PLAN  [x]  Upgrade activities as tolerated     [x]  Continue plan of care  []  Update interventions per flow sheet       []  Discharge due to:_  []  Other:_      Sneha Elliott, PT 3/16/2020  7:43 AM    Future Appointments   Date Time Provider Daljit Alford   3/18/2020  7:30 AM Nuria Leonard PTA [facility] [address]   3/25/2020  7:30 AM Nayla Beckwith, PT [facility] [address]   3/27/2020  7:30 AM Sharon Carroll, PT [facility] [address]

## 2020-03-18 ENCOUNTER — HOSPITAL ENCOUNTER (OUTPATIENT)
Dept: PHYSICAL THERAPY | Age: 30
Discharge: HOME | End: 2020-03-18
Payer: COMMERCIAL

## 2020-03-18 PROCEDURE — 97112 NEUROMUSCULAR REEDUCATION: CPT

## 2020-03-18 PROCEDURE — 97110 THERAPEUTIC EXERCISES: CPT

## 2020-03-18 NOTE — PROGRESS NOTES
PT DAILY TREATMENT NOTE 10-18    Patient Name: Larry Roberts  Date:3/18/2020  : 1990  [x]  Patient  Verified  Payor: BLUE CROSS / Plan: [facility] PPO / Product Type: PPO /    In time:732  Out time:814  Total Treatment Time (min): 42  Visit #: 6 of 8    Medicare/BCBS Only   Total Timed Codes (min):  42 1:1 Treatment Time:  42       Treatment Area: Left knee pain [M25.562]    SUBJECTIVE  Pain Level (0-10 scale): 4  Any medication changes, allergies to medications, adverse drug reactions, diagnosis change, or new procedure performed?: [x] No    [] Yes (see summary sheet for update)  Subjective functional status/changes:   [] No changes reported  \"I had more pain yesterday, but it calmed back down. \"    OBJECTIVE    Modality rationale: patient declined   Min Type Additional Details    [] Estim:  []Unatt       []IFC  []Premod                        []Other:  []w/ice   []w/heat  Position:  Location:    [] Estim: []Att    []TENS instruct  []NMES                    []Other:  []w/US   []w/ice   []w/heat  Position:  Location:    []  Traction: [] Cervical       []Lumbar                       [] Prone          []Supine                       []Intermittent   []Continuous Lbs:  [] before manual  [] after manual    []  Ultrasound: []Continuous   [] Pulsed                           []1MHz   []3MHz W/cm2:  Location:    []  Iontophoresis with dexamethasone         Location: [] Take home patch   [] In clinic    []  Ice     []  heat  []  Ice massage  []  Laser   []  Anodyne Position:  Location:    []  Laser with stim  []  Other:  Position:  Location:    []  Vasopneumatic Device Pressure:       [] lo [] med [] hi   Temperature: [] lo [] med [] hi   [] Skin assessment post-treatment:  []intact []redness- no adverse reaction    []redness  adverse reaction:     12 min Therapeutic Exercise:  [x] See flow sheet :   Rationale: increase ROM and increase strength to improve the patients ability to perform ADLs    30 min Neuromuscular Re-education:  [x]  See flow sheet : quad re-ed activities   Rationale: increase ROM, increase strength, improve coordination, improve balance and increase proprioception  to improve the patients ability to improve mobility, stance stability, and gait        With   [x] TE   [] TA   [x] neuro   [] other: Patient Education: [x] Review HEP    [] Progressed/Changed HEP based on:   [x] positioning   [x] body mechanics   [] transfers   [] heat/ice application    [] other:      Other Objective/Functional Measures:      Pain Level (0-10 scale) post treatment: 3    ASSESSMENT/Changes in Function: Pt is making progress with her knee stability, but her pain still fluctuates. Improved quad contraction during quad set and SLR. Patient will continue to benefit from skilled PT services to modify and progress therapeutic interventions, address functional mobility deficits, address ROM deficits, address strength deficits, analyze and address soft tissue restrictions, analyze and cue movement patterns, analyze and modify body mechanics/ergonomics, assess and modify postural abnormalities, address imbalance/dizziness and instruct in home and community integration to attain remaining goals. [x]  See Plan of Care  []  See progress note/recertification  []  See Discharge Summary         Progress towards goals / Updated goals:  Short Term Goals: To be accomplished in 1 weeks:  1. Therapist to establish HEP for strength and ROM to improve ease with ADLs/gait.                MET  Long Term Goals: To be accomplished in 4 weeks:  1. Patient will be independent with HEP to improve carryover of functional gains with ADLs between visits.             Eval Status:n/a              Reports compliance  2. Pt will increase left knee AROM to 0-130 deg to normalize gait pattern.               Eval Status:0-84              NOT MET; 0-76 deg  3.  Pt will increase FOTO score to 63 points to demonstrate improved functional mobility.             Eval Status: FOTO: 46              Assess at 30 day jada  4. Pt will increase right knee extension/ knee flexion/ hip flexion/hip abduction strength to grossly 4+/5 to improve ease with gait.             Eval Status:              Knee extension: 2/5              Knee flexion:2/5              NPZ flexion:3/5              Hip abduction: 4/5              Tolerating progression of exercises  5.  Pt will perform 12 steps using single handrail using reciprocal pattern to allow pt to ascend second story of his/her home.               Eval Status:single step pattern.              Reports improvement with ascending, but still challenged with descending    PLAN  []  Upgrade activities as tolerated     [x]  Continue plan of care  []  Update interventions per flow sheet       []  Discharge due to:_  []  Other:_      Daron Edwards, PTA, CSCS 3/18/2020  8:30 AM    Future Appointments   Date Time Provider Daljit Alford   3/25/2020  7:30 AM [facility]   3/27/2020  7:30 AM Sincere Eddy, PT [facility]

## 2020-03-25 ENCOUNTER — APPOINTMENT (OUTPATIENT)
Dept: PHYSICAL THERAPY | Age: 30
End: 2020-03-25
Payer: COMMERCIAL

## 2020-03-27 ENCOUNTER — APPOINTMENT (OUTPATIENT)
Dept: PHYSICAL THERAPY | Age: 30
End: 2020-03-27
Payer: COMMERCIAL

## 2020-04-16 NOTE — PROGRESS NOTES
[facility] [address] [facility] [address], [address] [phone] fax    Discharge Summary  Patient name: Kalia Kiser Start of Care: 2020   Referral source: Partha Godoy MD : 1990                Medical Diagnosis: Left knee pain [M25.562]  Payor: [facility] / Plan: [facility] PPO / Product Type: PPO /  Onset Date:2019                Treatment Diagnosis: left knee pain   Prior Hospitalization: see medical history Provider#: [id number]   Medications: Verified on Patient summary List    Comorbidities: thyroid issues   Prior Level of Function: ,  functionally independent      Visits from Start of Care: 6    Missed Visits: 0    Reporting Period : 20 to 3/18/20    Short Term Goals: To be accomplished in 1 weeks:  1. Therapist to establish HEP for strength and ROM to improve ease with ADLs/gait.                MET  Long Term Goals: To be accomplished in 4 weeks:  1. Patient will be independent with HEP to improve carryover of functional gains with ADLs between visits.             Eval Status:n/a              MET: Reports compliance  2. Pt will increase left knee AROM to 0-130 deg to normalize gait pattern.               Eval Status:0-84              NOT MET; 0-76 deg  3. Pt will increase FOTO score to 63 points to demonstrate improved functional mobility.             Eval Status: FOTO: 55              NOT MET - unable to reassess  4. Pt will increase right knee extension/ knee flexion/ hip flexion/hip abduction strength to grossly 4+/5 to improve ease with gait.             Eval Status:              Knee extension: 2/5              Knee flexion:2/5              EQP flexion:3/5              Hip abduction: 4/5              NOT MET - unable to reassess  5.  Pt will perform 12 steps using single handrail using reciprocal pattern to allow pt to ascend second story of his/her home.   Chris Gonzalez Status:single step pattern.              NOT MET - unable to reassess, Reports improvement with ascending, but still challenged with descending    Assessment/Summary of care: Ms. Paz Vallecillo was seen for 6 visits for left patellar dislocation, last visit in clinic on 3/18/20. Due to covid-19 scheduling limitations the patient is unable to be seen in the clinic. The patient has declined telehealth services at this time, and will continue independently with provided HEP to work on remaining deficits. The patient will be discharged from outpatient PT services at this time.        RECOMMENDATIONS:  [x]Discontinue therapy: []Patient has reached or is progressing toward set goals      [x]Patient has abdicated      []Due to lack of appreciable progress towards set [address], PT 4/16/2020 1:34 PM

## 2021-01-11 ENCOUNTER — HOSPITAL ENCOUNTER (OUTPATIENT)
Dept: LAB | Age: 31
Discharge: HOME | End: 2021-01-11
Payer: COMMERCIAL

## 2021-01-11 LAB
ERYTHROCYTE [SEDIMENTATION RATE] IN BLOOD: 1 MM/HR (ref 0–20)
FOLATE SERPL-MCNC: 18.1 NG/ML (ref 3.1–17.5)
TSH SERPL DL<=0.05 MIU/L-ACNC: 3.51 UIU/ML (ref 0.36–3.74)
VIT B12 SERPL-MCNC: 332 PG/ML (ref 211–911)

## 2021-01-11 PROCEDURE — 36415 COLL VENOUS BLD VENIPUNCTURE: CPT

## 2021-01-11 PROCEDURE — 82607 VITAMIN B-12: CPT

## 2021-01-11 PROCEDURE — 85652 RBC SED RATE AUTOMATED: CPT

## 2021-01-11 PROCEDURE — 86038 ANTINUCLEAR ANTIBODIES: CPT

## 2021-01-11 PROCEDURE — 84443 ASSAY THYROID STIM HORMONE: CPT

## 2021-01-11 PROCEDURE — 86780 TREPONEMA PALLIDUM: CPT

## 2021-01-12 LAB
ANA TITR SER IF: NEGATIVE {TITER}
T PALLIDUM AB SER QL IF: NON REACTIVE